# Patient Record
Sex: FEMALE | Race: WHITE | Employment: OTHER | URBAN - METROPOLITAN AREA
[De-identification: names, ages, dates, MRNs, and addresses within clinical notes are randomized per-mention and may not be internally consistent; named-entity substitution may affect disease eponyms.]

---

## 2017-02-06 ENCOUNTER — OFFICE VISIT (OUTPATIENT)
Dept: OPHTHALMOLOGY | Facility: CLINIC | Age: 62
End: 2017-02-06

## 2017-02-06 DIAGNOSIS — H52.03 HYPEROPIA WITH PRESBYOPIA OF BOTH EYES: Primary | ICD-10-CM

## 2017-02-06 DIAGNOSIS — H52.4 HYPEROPIA WITH PRESBYOPIA OF BOTH EYES: Primary | ICD-10-CM

## 2017-02-06 PROCEDURE — 92012 INTRM OPH EXAM EST PATIENT: CPT | Performed by: OPHTHALMOLOGY

## 2017-02-06 NOTE — PROGRESS NOTES
Alcario Oppenheim is a 57year old female. HPI:     HPI     EP. LDE: 911/16. 57year old female here for interest in contacts. Patient has a hx of hyperopia and presbyopia OU, and blepharitis OU and mom with macular degeneration.        Last edited b ibuprofen (MOTRIN) 600 MG Oral Tab as needed. Disp:  Rfl:    Escitalopram Oxalate (LEXAPRO) 10 MG Oral Tab Take  by mouth. Disp:  Rfl:    Omega-3 Fatty Acids (FISH OIL OR) by Does not apply route.  Disp:  Rfl:    Ascorbic Acid (VITAMIN C OR) Take  by mout ASSESSMENT/PLAN:     Diagnoses and Plan:     Hyperopia with presbyopia of both eyes   Trial contact lenses ordered today. Will call to set up training when contacts arrive. No orders of the defined types were placed in this encounter.        Meds

## 2017-02-06 NOTE — PATIENT INSTRUCTIONS
Hyperopia with presbyopia of both eyes   Trial contact lenses ordered today. Will call to set up training when contacts arrive.

## 2017-03-13 ENCOUNTER — TELEPHONE (OUTPATIENT)
Dept: OPHTHALMOLOGY | Facility: CLINIC | Age: 62
End: 2017-03-13

## 2017-03-27 ENCOUNTER — OFFICE VISIT (OUTPATIENT)
Dept: OPHTHALMOLOGY | Facility: CLINIC | Age: 62
End: 2017-03-27

## 2017-03-27 DIAGNOSIS — H52.4 HYPEROPIA WITH PRESBYOPIA OF BOTH EYES: Primary | ICD-10-CM

## 2017-03-27 DIAGNOSIS — H52.03 HYPEROPIA WITH PRESBYOPIA OF BOTH EYES: Primary | ICD-10-CM

## 2017-03-27 PROCEDURE — 99211 OFF/OP EST MAY X REQ PHY/QHP: CPT | Performed by: OPHTHALMOLOGY

## 2017-03-27 NOTE — ASSESSMENT & PLAN NOTE
Patient was instructed on insertion, removal and care of soft contact lenses. Patient was given [employer]. Patient was instructed to build up there wearing time of the contact lenses starting with two hours the first day.  Add two hours per day unt

## 2017-03-27 NOTE — PROGRESS NOTES
Nik Rodriguez is a 57year old female. HPI:     HPI     EP. Patient is here for a CL Training.        Last edited by Ariadna Wells O.T. on 3/27/2017  4:26 PM.     Patient History:  Past Medical History   Diagnosis Date   • OSTEOPENIA    • ANXIET Omega-3 Fatty Acids (FISH OIL OR) by Does not apply route. Disp:  Rfl:    Ascorbic Acid (VITAMIN C OR) Take  by mouth.  Disp:  Rfl:    MULTIVITAMINS OR None Entered Disp:  Rfl:    VITAMIN D OR None Entered Disp:  Rfl:        Allergies:  No Known Allergies vision or Bifocal contacts. Also gave patient trial of OD Distance Acuvue 2  +1.23/8.3 and Near OS Acuvue 2 +3.00 8.7. No Rx given for mono vision. Patient will see which lenses she prefers and call us back.         No orders of the defined types were place

## 2017-05-10 ENCOUNTER — TELEPHONE (OUTPATIENT)
Dept: OPHTHALMOLOGY | Facility: CLINIC | Age: 62
End: 2017-05-10

## 2017-05-10 NOTE — TELEPHONE ENCOUNTER
pt called. She states it feels like something in her eye. LOV 3/27/17 w/[facility]. She feels like she needs to see   Please advise.

## 2017-05-10 NOTE — TELEPHONE ENCOUNTER
Spoke with patient. She states that she had an infection in her left eye last week and was Rx'd Gentamycin drops by here PCP over the phone to use qid in OS. Patient states that the infection cleared up and she is off of the Gentamycin drops.  Patient ness

## 2017-09-20 PROCEDURE — 88175 CYTOPATH C/V AUTO FLUID REDO: CPT | Performed by: OBSTETRICS & GYNECOLOGY

## 2018-01-19 ENCOUNTER — TELEPHONE (OUTPATIENT)
Dept: OPHTHALMOLOGY | Facility: CLINIC | Age: 63
End: 2018-01-19

## 2018-01-19 NOTE — TELEPHONE ENCOUNTER
Patient states she has been having problems with her left eye for about a month. States it feels like there is something is it. Had appt scheduled for 03/05 but had to cancel due to [facility] being out of the clinic would like to see if she can be seen sooner.  P

## 2018-02-05 ENCOUNTER — OFFICE VISIT (OUTPATIENT)
Dept: OPHTHALMOLOGY | Facility: CLINIC | Age: 63
End: 2018-02-05

## 2018-02-05 DIAGNOSIS — H52.4 HYPEROPIA WITH PRESBYOPIA OF BOTH EYES: ICD-10-CM

## 2018-02-05 DIAGNOSIS — H40.053 OCULAR HYPERTENSION, BILATERAL: ICD-10-CM

## 2018-02-05 DIAGNOSIS — H01.00B BLEPHARITIS OF UPPER AND LOWER EYELIDS OF BOTH EYES, UNSPECIFIED TYPE: Primary | ICD-10-CM

## 2018-02-05 DIAGNOSIS — H52.03 HYPEROPIA WITH PRESBYOPIA OF BOTH EYES: ICD-10-CM

## 2018-02-05 DIAGNOSIS — H01.00A BLEPHARITIS OF UPPER AND LOWER EYELIDS OF BOTH EYES, UNSPECIFIED TYPE: Primary | ICD-10-CM

## 2018-02-05 PROCEDURE — 92015 DETERMINE REFRACTIVE STATE: CPT | Performed by: OPHTHALMOLOGY

## 2018-02-05 PROCEDURE — 76514 ECHO EXAM OF EYE THICKNESS: CPT | Performed by: OPHTHALMOLOGY

## 2018-02-05 PROCEDURE — 92014 COMPRE OPH EXAM EST PT 1/>: CPT | Performed by: OPHTHALMOLOGY

## 2018-02-05 RX ORDER — ROSUVASTATIN CALCIUM 10 MG/1
TABLET, COATED ORAL
COMMUNITY
Start: 2017-11-30

## 2018-02-05 NOTE — PROGRESS NOTES
Deniz Ceja is a 60year old female. HPI:     HPI     EP/ 60 yr old here for a complete exam. LDE 7/11/16; last seen on 3/27/17 with hx of hyperopia and presbyopia. Pt complains of occasional eye irritation and has been using OTC Refresh prn OU. Fish Oil Does not apply Oil  Disp:  Rfl:    Cyanocobalamin (VITAMIN B-12) 2500 MCG Sublingual SL Tab Take by mouth. Disp:  Rfl:    Escitalopram Oxalate (LEXAPRO) 10 MG Oral Tab Take  by mouth. Disp:  Rfl:    Ascorbic Acid (VITAMIN C OR) Take  by mouth.  D 20/20 +2.50 20/20    Type:  Progressive bifocal                 ASSESSMENT/PLAN:     Diagnoses and Plan:     Hyperopia with presbyopia of both eyes  New glasses    Blepharitis  Patient instructed to use lid hygiene once daily.   Apply baby shampoo to warm w

## 2018-08-16 ENCOUNTER — OFFICE VISIT (OUTPATIENT)
Dept: OPHTHALMOLOGY | Facility: CLINIC | Age: 63
End: 2018-08-16
Payer: COMMERCIAL

## 2018-08-16 DIAGNOSIS — H52.4 HYPEROPIA WITH PRESBYOPIA OF BOTH EYES: ICD-10-CM

## 2018-08-16 DIAGNOSIS — H40.053 OCULAR HYPERTENSION, BILATERAL: Primary | ICD-10-CM

## 2018-08-16 DIAGNOSIS — H52.03 HYPEROPIA WITH PRESBYOPIA OF BOTH EYES: ICD-10-CM

## 2018-08-16 PROCEDURE — 92012 INTRM OPH EXAM EST PATIENT: CPT | Performed by: OPHTHALMOLOGY

## 2018-08-16 PROCEDURE — 92133 CPTRZD OPH DX IMG PST SGM ON: CPT | Performed by: OPHTHALMOLOGY

## 2018-08-16 NOTE — PROGRESS NOTES
Amanda Gold is a 58year old female. HPI:     HPI     57 yo F here for IOP check. LDE: 2/5/18 Patient has history of [address] OU. She has hyperopia w/ presbyopia OU and blepharitis OU.      Last OCT:  None; last VF: none, last photos none  Patient d Amblyopia Neg    • Blindness Neg    • Fuchs' dystrophy Neg    • Retinal detachment Neg    • Strabismus Neg    • Thyroid disease Neg        Social History: Smoking status: Never Smoker                                                              Smokeless t +0.00 000 2.50    Left +1.25 +0.00 000 2.50    Type:  Progressive bifocal                 ASSESSMENT/PLAN:     Diagnoses and Plan:     Hyperopia with presbyopia of both eyes  Same glasses      Ocular hypertension, bilateral  No sign of glaucoma.   OCT adele

## 2018-08-16 NOTE — PATIENT INSTRUCTIONS
Hyperopia with presbyopia of both eyes  Same glasses      Ocular hypertension, bilateral  No sign of glaucoma.   OCT normal

## 2018-09-11 NOTE — PATIENT INSTRUCTIONS
Hyperopia with presbyopia of both eyes  New glasses    Blepharitis  Patient instructed to use lid hygiene once daily. Apply baby shampoo to warm washcloth and scrub eyelids gently with eyes closed, then rinse thoroughly.         Ocular hypertension, bilate
,DirectAddress_Unknown,[email or web address],DirectAddress_Unknown

## 2018-09-24 PROCEDURE — 88175 CYTOPATH C/V AUTO FLUID REDO: CPT | Performed by: OBSTETRICS & GYNECOLOGY

## 2019-02-25 ENCOUNTER — OFFICE VISIT (OUTPATIENT)
Dept: OPHTHALMOLOGY | Facility: CLINIC | Age: 64
End: 2019-02-25
Payer: COMMERCIAL

## 2019-02-25 DIAGNOSIS — H52.4 HYPEROPIA WITH PRESBYOPIA OF BOTH EYES: ICD-10-CM

## 2019-02-25 DIAGNOSIS — H52.03 HYPEROPIA WITH PRESBYOPIA OF BOTH EYES: ICD-10-CM

## 2019-02-25 DIAGNOSIS — H01.00B BLEPHARITIS OF UPPER AND LOWER EYELIDS OF BOTH EYES, UNSPECIFIED TYPE: ICD-10-CM

## 2019-02-25 DIAGNOSIS — H25.011 CORTICAL AGE-RELATED CATARACT OF RIGHT EYE: ICD-10-CM

## 2019-02-25 DIAGNOSIS — H40.053 OCULAR HYPERTENSION, BILATERAL: Primary | ICD-10-CM

## 2019-02-25 DIAGNOSIS — H01.00A BLEPHARITIS OF UPPER AND LOWER EYELIDS OF BOTH EYES, UNSPECIFIED TYPE: ICD-10-CM

## 2019-02-25 DIAGNOSIS — Z83.518 FAMILY HISTORY OF MACULAR DEGENERATION: ICD-10-CM

## 2019-02-25 PROCEDURE — 92014 COMPRE OPH EXAM EST PT 1/>: CPT | Performed by: OPHTHALMOLOGY

## 2019-02-25 PROCEDURE — 92133 CPTRZD OPH DX IMG PST SGM ON: CPT | Performed by: OPHTHALMOLOGY

## 2019-02-25 NOTE — PROGRESS NOTES
Alcario Oppenheim is a 58year old female. HPI:     HPI     EP/ 58 yr old here for a complete exam. LDE 2/5/18; last seen on 8/16/18 with Hx of ocular hypertension, hyperopia and presbyopia OU.  Pt denies any blurred vision OU at distance or near and i Neg    • Fuchs' dystrophy Neg    • Retinal detachment Neg    • Strabismus Neg    • Thyroid disease Neg        Social History: Social History    Tobacco Use      Smoking status: Never Smoker      Smokeless tobacco: Never Used    Alcohol use:  Yes      Alcoho Refraction       Sphere Cylinder Dist [place] Add [place]    Right +1.25 Sphere 20/20 +2.50 20/20    Left +1.50 Sphere 20/20 +2.50 20/20          Final Rx       Sphere Cylinder Dist VA Add [place]    Right +1.25 Sphere 20/20 +2.50 20/20    Left +1.50 Sphere 20/

## 2019-02-26 NOTE — ASSESSMENT & PLAN NOTE
IOP is 20 in each eye. No adjustment. OCT Full in each eye. No treatment is recommended. Recheck 6 months.

## 2019-02-26 NOTE — PATIENT INSTRUCTIONS
Ocular hypertension, bilateral  IOP is 20 in each eye. No adjustment. OCT Full in each eye. No treatment is recommended. Recheck 6 months. Hyperopia with presbyopia of both eyes  New glasses Rx given, but mild change.     Family history of macular degen

## 2019-08-26 ENCOUNTER — OFFICE VISIT (OUTPATIENT)
Dept: OPHTHALMOLOGY | Facility: CLINIC | Age: 64
End: 2019-08-26
Payer: MEDICARE

## 2019-08-26 DIAGNOSIS — H52.03 HYPEROPIA WITH PRESBYOPIA OF BOTH EYES: ICD-10-CM

## 2019-08-26 DIAGNOSIS — H25.011 CORTICAL AGE-RELATED CATARACT OF RIGHT EYE: Primary | ICD-10-CM

## 2019-08-26 DIAGNOSIS — H52.4 HYPEROPIA WITH PRESBYOPIA OF BOTH EYES: ICD-10-CM

## 2019-08-26 DIAGNOSIS — H40.053 OCULAR HYPERTENSION, BILATERAL: ICD-10-CM

## 2019-08-26 PROCEDURE — 92012 INTRM OPH EXAM EST PATIENT: CPT | Performed by: OPHTHALMOLOGY

## 2019-08-26 NOTE — PATIENT INSTRUCTIONS
Ocular hypertension, bilateral  IOP 20/20 Stable. Last OCT full both eyes.     Cortical age-related cataract of right eye  Mild, no treatment

## 2019-08-26 NOTE — PROGRESS NOTES
Carito Neri is a 71year old female. HPI:     HPI     Patient is here for a 6 month IOP check. She has a hx of ocular hyptertension, hyperopia with presbyopia OU, cortical age related catarct OD and family hx of macular degeneration (mother).  Sh • No Known Problems Other    • Amblyopia Neg    • Blindness Neg    • Fuchs' dystrophy Neg    • Retinal detachment Neg    • Strabismus Neg    • Thyroid disease Neg        Social History: Social History    Tobacco Use      Smoking status: Never Smoker Disc Normal Normal    C/D Ratio 0.3 0.3    Macula Normal Normal    Vessels Normal Normal            Refraction     Wearing Rx       Sphere Add    Right +1.25 +2.50    Left +1.25 +2.50                 ASSESSMENT/PLAN:     Diagnoses and Plan:     Ocular h

## 2019-09-25 PROCEDURE — 88175 CYTOPATH C/V AUTO FLUID REDO: CPT | Performed by: OBSTETRICS & GYNECOLOGY

## 2020-02-28 ENCOUNTER — OFFICE VISIT (OUTPATIENT)
Dept: OPHTHALMOLOGY | Facility: CLINIC | Age: 65
End: 2020-02-28
Payer: MEDICARE

## 2020-02-28 DIAGNOSIS — Z83.518 FAMILY HISTORY OF MACULAR DEGENERATION: ICD-10-CM

## 2020-02-28 DIAGNOSIS — H25.011 CORTICAL AGE-RELATED CATARACT OF RIGHT EYE: ICD-10-CM

## 2020-02-28 DIAGNOSIS — H40.053 OCULAR HYPERTENSION, BILATERAL: Primary | ICD-10-CM

## 2020-02-28 DIAGNOSIS — H52.03 HYPEROPIA WITH PRESBYOPIA OF BOTH EYES: ICD-10-CM

## 2020-02-28 DIAGNOSIS — H52.4 HYPEROPIA WITH PRESBYOPIA OF BOTH EYES: ICD-10-CM

## 2020-02-28 PROCEDURE — 92014 COMPRE OPH EXAM EST PT 1/>: CPT | Performed by: OPHTHALMOLOGY

## 2020-02-28 PROCEDURE — 92133 CPTRZD OPH DX IMG PST SGM ON: CPT | Performed by: OPHTHALMOLOGY

## 2020-02-28 PROCEDURE — 92015 DETERMINE REFRACTIVE STATE: CPT | Performed by: OPHTHALMOLOGY

## 2020-02-28 NOTE — PATIENT INSTRUCTIONS
Hyperopia with presbyopia of both eyes  New glasses    Ocular hypertension, bilateral  IOP 19/19  OCT Full    No sign of Glaucoma. Continue to follow. Cortical age-related cataract of right eye  Mild, no treatment.     Family history of macular degenerat

## 2020-02-28 NOTE — PROGRESS NOTES
Ivet Carlos is a 71year old female. HPI:     HPI     EP/ 71year old here for a complete exam and OCT. LDE was 2/25/19 she was last seen on 8/26/19 with a history of ocular hypertension, cataracts OU, presbyopia ou and hyperopia OU.   Patient s dystrophy Neg    • Retinal detachment Neg    • Strabismus Neg    • Thyroid disease Neg        Social History: Social History    Tobacco Use      Smoking status: Never Smoker      Smokeless tobacco: Never Used    Alcohol use:  Yes      Alcohol/week: 0.0 luis e Macula Normal Normal    Vessels Normal Normal    Periphery Normal Normal            Refraction     Wearing Rx       Sphere Cylinder Add    Right +1.25 Sphere +2.50    Left +1.25 Sphere +2.50    Type:  Progressive bifocal          Manifest Refraction

## 2020-08-24 ENCOUNTER — OFFICE VISIT (OUTPATIENT)
Dept: OPHTHALMOLOGY | Facility: CLINIC | Age: 65
End: 2020-08-24
Payer: MEDICARE

## 2020-08-24 DIAGNOSIS — H52.4 HYPEROPIA WITH PRESBYOPIA OF BOTH EYES: ICD-10-CM

## 2020-08-24 DIAGNOSIS — H52.03 HYPEROPIA WITH PRESBYOPIA OF BOTH EYES: ICD-10-CM

## 2020-08-24 DIAGNOSIS — H40.053 OCULAR HYPERTENSION, BILATERAL: Primary | ICD-10-CM

## 2020-08-24 PROCEDURE — 92012 INTRM OPH EXAM EST PATIENT: CPT | Performed by: OPHTHALMOLOGY

## 2020-08-24 NOTE — PATIENT INSTRUCTIONS
Ocular hypertension, bilateral  IOP 19/19  OCT Full from 2/28/20  No sign of Glaucoma. Continue to follow.     Hyperopia with presbyopia of both eyes  New glasses from 2/28/20

## 2020-08-24 NOTE — PROGRESS NOTES
Riley Cortez is a 76year old female. HPI:     HPI     EP/ 76year old here for an IOP check. LDE was 2/28/2020 with a history of ocular hypertension, cataracts OU, presbyopia ou and hyperopia OU. Patient states her vision is good.   She denies • Amblyopia Neg    • Blindness Neg    • Fuchs' dystrophy Neg    • Retinal detachment Neg    • Strabismus Neg    • Thyroid disease Neg        Social History: Social History    Tobacco Use      Smoking status: Never Smoker      Smokeless tobacco: Never Use +1.25 Sphere +2.50    Left +1.25 Sphere +2.50    Type:  Progressive bifocal            Contact Lens Exam     Current Contact Lens Rx       Brand Base Curve Diameter Sphere Add    Right [employer] Soflens Multifocal 8.50 14.5 +1.25 High    Left B[employer]ch

## 2021-02-22 ENCOUNTER — OFFICE VISIT (OUTPATIENT)
Dept: OPHTHALMOLOGY | Facility: CLINIC | Age: 66
End: 2021-02-22
Payer: MEDICARE

## 2021-02-22 DIAGNOSIS — H52.03 HYPEROPIA WITH PRESBYOPIA OF BOTH EYES: ICD-10-CM

## 2021-02-22 DIAGNOSIS — H52.4 HYPEROPIA WITH PRESBYOPIA OF BOTH EYES: ICD-10-CM

## 2021-02-22 DIAGNOSIS — H25.011 CORTICAL AGE-RELATED CATARACT OF RIGHT EYE: ICD-10-CM

## 2021-02-22 DIAGNOSIS — H40.053 OCULAR HYPERTENSION, BILATERAL: Primary | ICD-10-CM

## 2021-02-22 PROCEDURE — 92014 COMPRE OPH EXAM EST PT 1/>: CPT | Performed by: OPHTHALMOLOGY

## 2021-02-22 PROCEDURE — 92133 CPTRZD OPH DX IMG PST SGM ON: CPT | Performed by: OPHTHALMOLOGY

## 2021-02-22 PROCEDURE — 92015 DETERMINE REFRACTIVE STATE: CPT | Performed by: OPHTHALMOLOGY

## 2021-02-22 NOTE — PROGRESS NOTES
Ivette Romo is a 76year old female. HPI:     HPI     EP/ 76 yr old here for a complete exam and OCT. LDE 2/28/2020; last seen on 8/24/2020 with Hx of ocular hypertension; no glacuoma drops, cataracts OU, presbyopia ou and hyperopia OU.   Patient Maternal Grandmother    • No Known Problems Paternal Grandmother    • No Known Problems Paternal Grandfather    • No Known Problems Other    • Amblyopia Neg    • Blindness Neg    • Fuchs' dystrophy Neg    • Retinal detachment Neg    • Strabismus Neg    • T Fundus Exam       Right Left    Disc Normal Normal    C/D Ratio 0.3 0.3    Macula Normal Normal    Vessels Normal Normal    Periphery Normal Normal            Refraction     Wearing Rx       Sphere Cylinder Add    Right +1.75 Sphere +2.50    Left

## 2021-02-22 NOTE — PATIENT INSTRUCTIONS
Hyperopia with presbyopia of both eyes  Same glasses    Ocular hypertension, bilateral  IOP good 18/19  OCT full  No sign of Glaucoma. Cortical age-related cataract of right eye  Mild, no treatment.

## 2021-08-23 ENCOUNTER — OFFICE VISIT (OUTPATIENT)
Dept: OPHTHALMOLOGY | Facility: CLINIC | Age: 66
End: 2021-08-23
Payer: MEDICARE

## 2021-08-23 DIAGNOSIS — H25.011 CORTICAL AGE-RELATED CATARACT OF RIGHT EYE: ICD-10-CM

## 2021-08-23 DIAGNOSIS — H40.053 OCULAR HYPERTENSION, BILATERAL: Primary | ICD-10-CM

## 2021-08-23 PROCEDURE — 92012 INTRM OPH EXAM EST PATIENT: CPT | Performed by: OPHTHALMOLOGY

## 2021-08-23 PROCEDURE — 92133 CPTRZD OPH DX IMG PST SGM ON: CPT | Performed by: OPHTHALMOLOGY

## 2021-08-23 NOTE — PROGRESS NOTES
Austin Lazo is a 78year old female. HPI:     HPI     EP/ 78 yr old here for an OCT and IOP check. LDE 2/22/21 with Hx of hyperopia, presbyopia, ocular hypertension and age related cataract in the right eye.  Pt states that her vision is stable a Problems Other    • Amblyopia Neg    • Blindness Neg    • Fuchs' dystrophy Neg    • Retinal detachment Neg    • Strabismus Neg    • Thyroid disease Neg        Social History: Social History    Tobacco Use      Smoking status: Never Smoker      Smokeless to undilated            Refraction     Wearing Rx       Sphere Cylinder Add    Right +1.75 Sphere +2.50    Left +1.75 Sphere +2.50    Type: Progressive bifocal                 ASSESSMENT/PLAN:     Diagnoses and Plan:     Ocular hypertension, bilateral  IOP 22

## 2021-08-23 NOTE — PATIENT INSTRUCTIONS
Ocular hypertension, bilateral  IOP 22/22 otday. OCT Full, Normal both eyes. Recheck 4 to 6 weeks.  IOP and Visual Field

## 2021-10-11 ENCOUNTER — OFFICE VISIT (OUTPATIENT)
Dept: OPHTHALMOLOGY | Facility: CLINIC | Age: 66
End: 2021-10-11
Payer: MEDICARE

## 2021-10-11 DIAGNOSIS — H40.053 OCULAR HYPERTENSION, BILATERAL: Primary | ICD-10-CM

## 2021-10-11 DIAGNOSIS — H52.4 HYPEROPIA WITH PRESBYOPIA OF BOTH EYES: ICD-10-CM

## 2021-10-11 DIAGNOSIS — H25.011 CORTICAL AGE-RELATED CATARACT OF RIGHT EYE: ICD-10-CM

## 2021-10-11 DIAGNOSIS — H52.03 HYPEROPIA WITH PRESBYOPIA OF BOTH EYES: ICD-10-CM

## 2021-10-11 PROCEDURE — 92083 EXTENDED VISUAL FIELD XM: CPT | Performed by: OPHTHALMOLOGY

## 2021-10-11 PROCEDURE — 92012 INTRM OPH EXAM EST PATIENT: CPT | Performed by: OPHTHALMOLOGY

## 2021-10-11 RX ORDER — LATANOPROST 50 UG/ML
1 SOLUTION/ DROPS OPHTHALMIC NIGHTLY
Qty: 7.5 ML | Refills: 3 | Status: SHIPPED | OUTPATIENT
Start: 2021-10-11

## 2021-10-11 NOTE — PATIENT INSTRUCTIONS
Cortical age-related cataract of right eye  Mild, no treatment. Ocular hypertension, bilateral  IOP 22/22 today, adjusted same. OCT Full both eyes and VF Normal both eyes   Recommend Latanoprost 1 drop in each eye at night for Neuroprotection.

## 2021-10-11 NOTE — ASSESSMENT & PLAN NOTE
IOP 22/22 today, adjusted same. OCT Full both eyes and VF Normal both eyes   Recommend Latanoprost 1 drop in each eye at night for Neuroprotection.

## 2021-10-11 NOTE — PROGRESS NOTES
Shola Yusuf is a 78year old female. HPI:     HPI     EP/ 78 yr old here for a VF, IOP and recheck vision. LDE 2/22/21; last seen on 8/23/21 with Hx of hyperopia, presbyopia, ocular hypertension and age related cataract in the right eye.  Pt is n Grandmother    • No Known Problems Paternal Grandfather    • No Known Problems Other    • Amblyopia Neg    • Blindness Neg    • Fuchs' dystrophy Neg    • Retinal detachment Neg    • Strabismus Neg    • Thyroid disease Neg        Social History: Social Hist Lens Trace Cortical cataract Clear    Vitreous Clear Clear          Fundus Exam       Right Left    Disc Normal Normal    C/D Ratio 0.3 0.3    Macula Normal Normal    Vessels Normal Normal    undilated            Refraction     Wearing Rx       Sphere C

## 2022-01-24 ENCOUNTER — OFFICE VISIT (OUTPATIENT)
Dept: OPHTHALMOLOGY | Facility: CLINIC | Age: 67
End: 2022-01-24
Payer: MEDICARE

## 2022-01-24 DIAGNOSIS — H52.03 HYPEROPIA WITH PRESBYOPIA OF BOTH EYES: ICD-10-CM

## 2022-01-24 DIAGNOSIS — H52.4 HYPEROPIA WITH PRESBYOPIA OF BOTH EYES: ICD-10-CM

## 2022-01-24 DIAGNOSIS — H40.053 OCULAR HYPERTENSION, BILATERAL: Primary | ICD-10-CM

## 2022-01-24 PROCEDURE — 92012 INTRM OPH EXAM EST PATIENT: CPT | Performed by: OPHTHALMOLOGY

## 2022-01-24 NOTE — PATIENT INSTRUCTIONS
Ocular hypertension, bilateral  IOP 16/16 today on Latanoprost.  Doing well.       Hyperopia with presbyopia of both eyes  Same glasses

## 2022-01-24 NOTE — PROGRESS NOTES
Link Powers is a 78year old female. HPI:     HPI     EP/ 78 yr old here for an IOP check. LDE 2/22/21; last seen on 10/11/21 with Hx of hyperopia, presbyopia, ocular hypertension and age related cataract in the right eye.  She was started on Lat Paternal Grandmother    • No Known Problems Paternal Grandfather    • No Known Problems Other    • Amblyopia Neg    • Blindness Neg    • Fuchs' dystrophy Neg    • Retinal detachment Neg    • Strabismus Neg    • Thyroid disease Neg        Social History:  So Normal Normal    Lens Trace Cortical cataract Clear    Vitreous Clear Clear          Fundus Exam       Right Left    Disc Normal Normal    C/D Ratio 0.3 0.3    Macula Normal Normal    Vessels Normal Normal    undilated            Refraction     Wearing Rx

## 2022-02-07 ENCOUNTER — TELEPHONE (OUTPATIENT)
Dept: OPHTHALMOLOGY | Facility: CLINIC | Age: 67
End: 2022-02-07

## 2022-02-07 NOTE — TELEPHONE ENCOUNTER
Spoke to patient, requesting to move 4/1/22 appointment due to having to go out of town for an extended period of time. Pt would like to be seen before she has to leave.  Pt scheduled 3/21/22 @ 8:30am

## 2022-02-07 NOTE — TELEPHONE ENCOUNTER
Patient indicates she was told to call and ask for Johna Essex if she needs to reschedule her appointment on 4/1. Please call at [phone],[email or web address].

## 2022-03-21 ENCOUNTER — OFFICE VISIT (OUTPATIENT)
Dept: OPHTHALMOLOGY | Facility: CLINIC | Age: 67
End: 2022-03-21
Payer: MEDICARE

## 2022-03-21 DIAGNOSIS — H52.03 HYPEROPIA WITH PRESBYOPIA OF BOTH EYES: ICD-10-CM

## 2022-03-21 DIAGNOSIS — H25.011 CORTICAL AGE-RELATED CATARACT OF RIGHT EYE: ICD-10-CM

## 2022-03-21 DIAGNOSIS — H52.4 HYPEROPIA WITH PRESBYOPIA OF BOTH EYES: ICD-10-CM

## 2022-03-21 DIAGNOSIS — H40.053 OCULAR HYPERTENSION, BILATERAL: Primary | ICD-10-CM

## 2022-03-21 DIAGNOSIS — H01.02B SQUAMOUS BLEPHARITIS OF UPPER AND LOWER EYELIDS OF BOTH EYES: ICD-10-CM

## 2022-03-21 DIAGNOSIS — H01.02A SQUAMOUS BLEPHARITIS OF UPPER AND LOWER EYELIDS OF BOTH EYES: ICD-10-CM

## 2022-03-21 PROCEDURE — 92015 DETERMINE REFRACTIVE STATE: CPT | Performed by: OPHTHALMOLOGY

## 2022-03-21 PROCEDURE — 92014 COMPRE OPH EXAM EST PT 1/>: CPT | Performed by: OPHTHALMOLOGY

## 2022-03-21 PROCEDURE — 92133 CPTRZD OPH DX IMG PST SGM ON: CPT | Performed by: OPHTHALMOLOGY

## 2022-03-21 NOTE — ASSESSMENT & PLAN NOTE
IOP 15/16 today on Latanoprost.  OCT Full both eyes  Doing well. Continue Latanoprost at night both eyes.

## 2022-03-21 NOTE — PATIENT INSTRUCTIONS
Ocular hypertension, bilateral  IOP 15/16 today on Latanoprost.  OCT Full both eyes  Doing well. Continue Latanoprost at night both eyes. Cortical age-related cataract of right eye  Mild, no treatment. Blepharitis  Patient instructed to use lid hygiene daily. Apply baby shampoo to warm washcloth and cleanse eyelids gently with eyes closed, then rinse thoroughly.         Hyperopia with presbyopia of both eyes  Same Rx

## 2022-03-21 NOTE — ASSESSMENT & PLAN NOTE
Patient instructed to use lid hygiene daily. Apply baby shampoo to warm washcloth and cleanse eyelids gently with eyes closed, then rinse thoroughly.

## 2022-08-19 ENCOUNTER — TELEPHONE (OUTPATIENT)
Dept: OPHTHALMOLOGY | Facility: CLINIC | Age: 67
End: 2022-08-19

## 2022-08-19 NOTE — TELEPHONE ENCOUNTER
Per pt would like to rs 9/23/22, will be out of town and needs to be seen every 6 months.  Please advise

## 2022-10-07 ENCOUNTER — OFFICE VISIT (OUTPATIENT)
Dept: OPHTHALMOLOGY | Facility: CLINIC | Age: 67
End: 2022-10-07
Payer: MEDICARE

## 2022-10-07 DIAGNOSIS — H52.03 HYPEROPIA WITH PRESBYOPIA OF BOTH EYES: ICD-10-CM

## 2022-10-07 DIAGNOSIS — H40.053 OCULAR HYPERTENSION, BILATERAL: Primary | ICD-10-CM

## 2022-10-07 DIAGNOSIS — H25.011 CORTICAL AGE-RELATED CATARACT OF RIGHT EYE: ICD-10-CM

## 2022-10-07 DIAGNOSIS — H52.4 HYPEROPIA WITH PRESBYOPIA OF BOTH EYES: ICD-10-CM

## 2022-10-07 PROCEDURE — 92133 CPTRZD OPH DX IMG PST SGM ON: CPT | Performed by: OPHTHALMOLOGY

## 2022-10-07 PROCEDURE — 92012 INTRM OPH EXAM EST PATIENT: CPT | Performed by: OPHTHALMOLOGY

## 2022-10-07 PROCEDURE — 92083 EXTENDED VISUAL FIELD XM: CPT | Performed by: OPHTHALMOLOGY

## 2022-10-07 NOTE — ASSESSMENT & PLAN NOTE
IOP 16/16 today on Latanoprost.  OCT Full both eyes. RNFL has decresed slightly. Visual Fields Full both eyes    Doing well. Continue Latanoprost at night both eyes. Recommend See Dr. Roger Adams for consultation.

## 2022-10-07 NOTE — PATIENT INSTRUCTIONS
Hyperopia with presbyopia of both eyes  Same glasses. Ocular hypertension, bilateral  IOP 16/16 today on Latanoprost.  OCT Full both eyes. RNFL has decresed slightly. Visual Fields Full both eyes    Doing well. Continue Latanoprost at night both eyes. Recommend See Dr. Karlie Costa for consultation. Cortical age-related cataract of right eye  Mild, no treatment. RC, informed of instructions in mychart

## 2023-03-15 ENCOUNTER — TELEPHONE (OUTPATIENT)
Dept: OPHTHALMOLOGY | Facility: CLINIC | Age: 68
End: 2023-03-15

## 2023-03-15 NOTE — TELEPHONE ENCOUNTER
Per pt has questions regarding prescription. Also had to cancel appt on 4/7 due to not being out of town, offered next available in June pt declined, states Dr. Spring Burger can fit her in sooner. Please call thank you.

## 2023-04-10 ENCOUNTER — OFFICE VISIT (OUTPATIENT)
Dept: OPHTHALMOLOGY | Facility: CLINIC | Age: 68
End: 2023-04-10

## 2023-04-10 DIAGNOSIS — H01.02A SQUAMOUS BLEPHARITIS OF UPPER AND LOWER EYELIDS OF BOTH EYES: ICD-10-CM

## 2023-04-10 DIAGNOSIS — H25.013 CORTICAL AGE-RELATED CATARACT OF BOTH EYES: ICD-10-CM

## 2023-04-10 DIAGNOSIS — H52.03 HYPEROPIA WITH PRESBYOPIA OF BOTH EYES: ICD-10-CM

## 2023-04-10 DIAGNOSIS — H52.4 HYPEROPIA WITH PRESBYOPIA OF BOTH EYES: ICD-10-CM

## 2023-04-10 DIAGNOSIS — H40.053 OCULAR HYPERTENSION, BILATERAL: Primary | ICD-10-CM

## 2023-04-10 DIAGNOSIS — H01.02B SQUAMOUS BLEPHARITIS OF UPPER AND LOWER EYELIDS OF BOTH EYES: ICD-10-CM

## 2023-04-10 DIAGNOSIS — Z83.518 FAMILY HISTORY OF MACULAR DEGENERATION: ICD-10-CM

## 2023-04-10 RX ORDER — LATANOPROST 50 UG/ML
1 SOLUTION/ DROPS OPHTHALMIC NIGHTLY
Qty: 7.5 ML | Refills: 3 | Status: SHIPPED | OUTPATIENT
Start: 2023-04-10

## 2023-04-10 NOTE — ASSESSMENT & PLAN NOTE
IOP 16/17 today on Latanoprost.  OCT Full both eyes. Doing well. Continue Latanoprost at night both eyes. Continue with  Dr. Benji Morris for glaucoma management.

## 2023-04-10 NOTE — PATIENT INSTRUCTIONS
Hyperopia with presbyopia of both eyes  Same Rx. Cortical age-related cataract of both eyes  Mild, no treatment. Ocular hypertension, bilateral  IOP 16/17 today on Latanoprost.  OCT Full both eyes. Doing well. Continue Latanoprost at night both eyes. Continue with  Dr. Faye Fox for glaucoma management. Blepharitis  Patient instructed to use lid hygiene daily. Apply baby shampoo to warm washcloth and cleanse eyelids gently with eyes closed, then rinse thoroughly. Family history of macular degeneration- mother  No sign of macular degeneration in patient.

## 2023-12-04 DIAGNOSIS — H40.053 OCULAR HYPERTENSION, BILATERAL: ICD-10-CM

## 2023-12-04 RX ORDER — LATANOPROST 50 UG/ML
1 SOLUTION/ DROPS OPHTHALMIC NIGHTLY
Qty: 7.5 ML | Refills: 0 | Status: SHIPPED | OUTPATIENT
Start: 2023-12-04

## 2023-12-09 DIAGNOSIS — H40.053 OCULAR HYPERTENSION, BILATERAL: ICD-10-CM

## 2023-12-09 RX ORDER — LATANOPROST 50 UG/ML
1 SOLUTION/ DROPS OPHTHALMIC NIGHTLY
Qty: 7.5 ML | Refills: 0 | OUTPATIENT
Start: 2023-12-09

## 2024-01-31 DIAGNOSIS — H40.053 OCULAR HYPERTENSION, BILATERAL: ICD-10-CM

## 2024-02-13 RX ORDER — LATANOPROST 50 UG/ML
1 SOLUTION/ DROPS OPHTHALMIC NIGHTLY
Qty: 7.5 ML | Refills: 0 | Status: SHIPPED | OUTPATIENT
Start: 2024-02-13

## 2024-04-23 DIAGNOSIS — H40.053 OCULAR HYPERTENSION, BILATERAL: ICD-10-CM

## 2024-04-23 RX ORDER — LATANOPROST 50 UG/ML
1 SOLUTION/ DROPS OPHTHALMIC NIGHTLY
Qty: 7.5 ML | Refills: 0 | Status: SHIPPED | OUTPATIENT
Start: 2024-04-23

## 2024-04-23 NOTE — TELEPHONE ENCOUNTER
Pt does not get refills through us. She has a glaucoma specialist that  recommended, she will call them for the refills from now on.

## (undated) NOTE — MR AVS SNAPSHOT
[address]  [address]  [phone]               Thank you for choosing us for your health care visit with [facility].  Lawyer Sandro MD.  We are glad to serve you and happy to provide you with this tejeda If you've recently had a stay at the Hospital you can access your discharge instructions in [facility] by going to Visits < Admission Summaries.  If you've been to the Emergency Department or your doctor's office, you can view your past visit information in My Visit [facility] online at  [email or web address]

## (undated) NOTE — MR AVS SNAPSHOT
[address]  [address]  [phone]               Thank you for choosing us for your health care visit with [facility].  Batsheav Wu MD.  We are glad to serve you and happy to provide you with this tejeda Call [phone] for help. [facility] is NOT to be used for urgent needs. For medical emergencies, dial 911.            Visit [facility] online at  Mydishtn